# Patient Record
Sex: MALE | Race: BLACK OR AFRICAN AMERICAN | NOT HISPANIC OR LATINO | Employment: FULL TIME | URBAN - METROPOLITAN AREA
[De-identification: names, ages, dates, MRNs, and addresses within clinical notes are randomized per-mention and may not be internally consistent; named-entity substitution may affect disease eponyms.]

---

## 2024-10-12 ENCOUNTER — CLINICAL SUPPORT (OUTPATIENT)
Dept: EMERGENCY MEDICINE | Facility: HOSPITAL | Age: 34
End: 2024-10-12

## 2024-10-12 ENCOUNTER — HOSPITAL ENCOUNTER (OUTPATIENT)
Facility: HOSPITAL | Age: 34
Setting detail: OBSERVATION
End: 2024-10-12
Attending: EMERGENCY MEDICINE

## 2024-10-12 ENCOUNTER — APPOINTMENT (OUTPATIENT)
Dept: RADIOLOGY | Facility: HOSPITAL | Age: 34
End: 2024-10-12

## 2024-10-12 DIAGNOSIS — E11.65 TYPE 2 DIABETES MELLITUS WITH HYPERGLYCEMIA, WITH LONG-TERM CURRENT USE OF INSULIN: Primary | ICD-10-CM

## 2024-10-12 DIAGNOSIS — E10.9 INSULIN DEPENDENT DIABETES MELLITUS TYPE IA (MULTI): ICD-10-CM

## 2024-10-12 DIAGNOSIS — Z79.4 TYPE 2 DIABETES MELLITUS WITH HYPERGLYCEMIA, WITH LONG-TERM CURRENT USE OF INSULIN: Primary | ICD-10-CM

## 2024-10-12 DIAGNOSIS — Z91.199 MEDICALLY NONCOMPLIANT: ICD-10-CM

## 2024-10-12 DIAGNOSIS — R73.9 HYPERGLYCEMIA: ICD-10-CM

## 2024-10-12 DIAGNOSIS — E11.9 TYPE 2 DIABETES MELLITUS WITHOUT COMPLICATION, UNSPECIFIED WHETHER LONG TERM INSULIN USE (MULTI): ICD-10-CM

## 2024-10-12 DIAGNOSIS — K21.9 GASTROESOPHAGEAL REFLUX DISEASE, UNSPECIFIED WHETHER ESOPHAGITIS PRESENT: ICD-10-CM

## 2024-10-12 LAB
ALBUMIN SERPL BCP-MCNC: 4.6 G/DL (ref 3.4–5)
ALP SERPL-CCNC: 77 U/L (ref 33–120)
ALT SERPL W P-5'-P-CCNC: 18 U/L (ref 10–52)
ANION GAP SERPL CALC-SCNC: 13 MMOL/L (ref 10–20)
APPEARANCE UR: CLEAR
AST SERPL W P-5'-P-CCNC: 13 U/L (ref 9–39)
B-OH-BUTYR SERPL-SCNC: 0.31 MMOL/L (ref 0.02–0.27)
BASOPHILS # BLD AUTO: 0.05 X10*3/UL (ref 0–0.1)
BASOPHILS NFR BLD AUTO: 0.8 %
BILIRUB SERPL-MCNC: 0.5 MG/DL (ref 0–1.2)
BILIRUB UR STRIP.AUTO-MCNC: NEGATIVE MG/DL
BUN SERPL-MCNC: 8 MG/DL (ref 6–23)
CALCIUM SERPL-MCNC: 9.8 MG/DL (ref 8.6–10.6)
CARDIAC TROPONIN I PNL SERPL HS: 6 NG/L (ref 0–53)
CARDIAC TROPONIN I PNL SERPL HS: 7 NG/L (ref 0–53)
CARDIAC TROPONIN I PNL SERPL HS: 7 NG/L (ref 0–53)
CHLORIDE SERPL-SCNC: 101 MMOL/L (ref 98–107)
CO2 SERPL-SCNC: 25 MMOL/L (ref 21–32)
COLOR UR: ABNORMAL
CREAT SERPL-MCNC: 0.62 MG/DL (ref 0.5–1.3)
EGFRCR SERPLBLD CKD-EPI 2021: >90 ML/MIN/1.73M*2
EOSINOPHIL # BLD AUTO: 0.25 X10*3/UL (ref 0–0.7)
EOSINOPHIL NFR BLD AUTO: 4.2 %
ERYTHROCYTE [DISTWIDTH] IN BLOOD BY AUTOMATED COUNT: 12.6 % (ref 11.5–14.5)
GLUCOSE BLD MANUAL STRIP-MCNC: 183 MG/DL (ref 74–99)
GLUCOSE BLD MANUAL STRIP-MCNC: 280 MG/DL (ref 74–99)
GLUCOSE BLD MANUAL STRIP-MCNC: 497 MG/DL (ref 74–99)
GLUCOSE SERPL-MCNC: 294 MG/DL (ref 74–99)
GLUCOSE UR STRIP.AUTO-MCNC: ABNORMAL MG/DL
HCT VFR BLD AUTO: 43.2 % (ref 41–52)
HGB BLD-MCNC: 14.3 G/DL (ref 13.5–17.5)
IMM GRANULOCYTES # BLD AUTO: 0.01 X10*3/UL (ref 0–0.7)
IMM GRANULOCYTES NFR BLD AUTO: 0.2 % (ref 0–0.9)
INR PPP: 0.8 (ref 0.9–1.1)
KETONES UR STRIP.AUTO-MCNC: ABNORMAL MG/DL
LEUKOCYTE ESTERASE UR QL STRIP.AUTO: NEGATIVE
LIPASE SERPL-CCNC: 12 U/L (ref 9–82)
LYMPHOCYTES # BLD AUTO: 2.36 X10*3/UL (ref 1.2–4.8)
LYMPHOCYTES NFR BLD AUTO: 39.7 %
MCH RBC QN AUTO: 27.5 PG (ref 26–34)
MCHC RBC AUTO-ENTMCNC: 33.1 G/DL (ref 32–36)
MCV RBC AUTO: 83 FL (ref 80–100)
MONOCYTES # BLD AUTO: 0.39 X10*3/UL (ref 0.1–1)
MONOCYTES NFR BLD AUTO: 6.6 %
NEUTROPHILS # BLD AUTO: 2.88 X10*3/UL (ref 1.2–7.7)
NEUTROPHILS NFR BLD AUTO: 48.5 %
NITRITE UR QL STRIP.AUTO: NEGATIVE
NRBC BLD-RTO: 0 /100 WBCS (ref 0–0)
PH UR STRIP.AUTO: 5.5 [PH]
PLATELET # BLD AUTO: 365 X10*3/UL (ref 150–450)
POTASSIUM SERPL-SCNC: 3.8 MMOL/L (ref 3.5–5.3)
PROT SERPL-MCNC: 7.9 G/DL (ref 6.4–8.2)
PROT UR STRIP.AUTO-MCNC: NEGATIVE MG/DL
PROTHROMBIN TIME: 8.5 SECONDS (ref 9.8–12.8)
RBC # BLD AUTO: 5.2 X10*6/UL (ref 4.5–5.9)
RBC # UR STRIP.AUTO: NEGATIVE /UL
SODIUM SERPL-SCNC: 135 MMOL/L (ref 136–145)
SP GR UR STRIP.AUTO: >1.05
UROBILINOGEN UR STRIP.AUTO-MCNC: NORMAL MG/DL
WBC # BLD AUTO: 5.9 X10*3/UL (ref 4.4–11.3)

## 2024-10-12 PROCEDURE — 82947 ASSAY GLUCOSE BLOOD QUANT: CPT

## 2024-10-12 PROCEDURE — 83690 ASSAY OF LIPASE: CPT | Performed by: EMERGENCY MEDICINE

## 2024-10-12 PROCEDURE — 84484 ASSAY OF TROPONIN QUANT: CPT | Performed by: EMERGENCY MEDICINE

## 2024-10-12 PROCEDURE — 99285 EMERGENCY DEPT VISIT HI MDM: CPT | Performed by: EMERGENCY MEDICINE

## 2024-10-12 PROCEDURE — 93005 ELECTROCARDIOGRAM TRACING: CPT

## 2024-10-12 PROCEDURE — 71045 X-RAY EXAM CHEST 1 VIEW: CPT

## 2024-10-12 PROCEDURE — 36415 COLL VENOUS BLD VENIPUNCTURE: CPT

## 2024-10-12 PROCEDURE — 84075 ASSAY ALKALINE PHOSPHATASE: CPT | Performed by: EMERGENCY MEDICINE

## 2024-10-12 PROCEDURE — 85025 COMPLETE CBC W/AUTO DIFF WBC: CPT | Performed by: EMERGENCY MEDICINE

## 2024-10-12 PROCEDURE — 81003 URINALYSIS AUTO W/O SCOPE: CPT

## 2024-10-12 PROCEDURE — 84132 ASSAY OF SERUM POTASSIUM: CPT | Mod: 59

## 2024-10-12 PROCEDURE — 84484 ASSAY OF TROPONIN QUANT: CPT

## 2024-10-12 PROCEDURE — 2500000002 HC RX 250 W HCPCS SELF ADMINISTERED DRUGS (ALT 637 FOR MEDICARE OP, ALT 636 FOR OP/ED)

## 2024-10-12 PROCEDURE — 82435 ASSAY OF BLOOD CHLORIDE: CPT

## 2024-10-12 PROCEDURE — 99285 EMERGENCY DEPT VISIT HI MDM: CPT

## 2024-10-12 PROCEDURE — 85610 PROTHROMBIN TIME: CPT | Performed by: EMERGENCY MEDICINE

## 2024-10-12 PROCEDURE — 36415 COLL VENOUS BLD VENIPUNCTURE: CPT | Performed by: EMERGENCY MEDICINE

## 2024-10-12 PROCEDURE — 80053 COMPREHEN METABOLIC PANEL: CPT | Performed by: EMERGENCY MEDICINE

## 2024-10-12 PROCEDURE — 82010 KETONE BODYS QUAN: CPT

## 2024-10-12 PROCEDURE — 71045 X-RAY EXAM CHEST 1 VIEW: CPT | Performed by: RADIOLOGY

## 2024-10-12 PROCEDURE — 83036 HEMOGLOBIN GLYCOSYLATED A1C: CPT

## 2024-10-12 RX ORDER — INSULIN LISPRO 100 [IU]/ML
0-10 INJECTION, SOLUTION INTRAVENOUS; SUBCUTANEOUS
Status: DISCONTINUED | OUTPATIENT
Start: 2024-10-12 | End: 2024-10-13

## 2024-10-12 RX ADMIN — INSULIN LISPRO 4 UNITS: 100 INJECTION, SOLUTION INTRAVENOUS; SUBCUTANEOUS at 18:39

## 2024-10-12 ASSESSMENT — COLUMBIA-SUICIDE SEVERITY RATING SCALE - C-SSRS
6. HAVE YOU EVER DONE ANYTHING, STARTED TO DO ANYTHING, OR PREPARED TO DO ANYTHING TO END YOUR LIFE?: NO
1. IN THE PAST MONTH, HAVE YOU WISHED YOU WERE DEAD OR WISHED YOU COULD GO TO SLEEP AND NOT WAKE UP?: NO
2. HAVE YOU ACTUALLY HAD ANY THOUGHTS OF KILLING YOURSELF?: NO

## 2024-10-12 ASSESSMENT — PAIN - FUNCTIONAL ASSESSMENT: PAIN_FUNCTIONAL_ASSESSMENT: 0-10

## 2024-10-12 ASSESSMENT — LIFESTYLE VARIABLES
HAVE YOU EVER FELT YOU SHOULD CUT DOWN ON YOUR DRINKING: NO
EVER HAD A DRINK FIRST THING IN THE MORNING TO STEADY YOUR NERVES TO GET RID OF A HANGOVER: NO
EVER FELT BAD OR GUILTY ABOUT YOUR DRINKING: NO
HAVE PEOPLE ANNOYED YOU BY CRITICIZING YOUR DRINKING: NO
TOTAL SCORE: 0

## 2024-10-12 ASSESSMENT — PAIN DESCRIPTION - LOCATION: LOCATION: CHEST

## 2024-10-12 ASSESSMENT — PAIN SCALES - GENERAL: PAINLEVEL_OUTOF10: 6

## 2024-10-12 NOTE — ED PROVIDER NOTES
History of Present Illness     History provided by: Patient   Limitations to History: None   External Records Reviewed with Brief Summary: I reviewed the patient's ED note from [facility] where he was seen and evaluated on 10/12/2024.    HPI:  Maximino Solomon is a 33 y.o. male with a past medical history of insulin-dependent diabetes presenting to the emergency department today with concern for hyperglycemia.  Notes that he has been having some chest pain in addition to abdominal pain that is consistent with the symptoms he experiences when his blood glucose is elevated.  States that he is supposed to be taking insulin however has not taken insulin in several weeks, has difficulty with affordability of the medications in addition to insurance status.  Is only on insulin is not on any oral medications.  Notes that he has been having some nausea but has not experienced any vomiting.  Denies any shortness of breath.  Describes the chest pain as a sharp pain that comes on with rolling in bed and constant with movement.  Notes that he is been having some urinary frequency as well.  Denies any vision changes headache or dizziness.  He said he is supposed to be employed by [employer] in several weeks and then has 90 days prior to his insurance kicking in.  No other associated signs or symptoms report at this time.  Physical Exam   Triage vitals:  T 36.8 °C (98.2 °F)  HR 92  /71  RR 16  O2 94 % None (Room air)    Physical Exam  Constitutional:       General: He is not in acute distress.     Appearance: Normal appearance. He is not toxic-appearing.   HENT:      Head: Normocephalic and atraumatic.      Mouth/Throat:      Mouth: Mucous membranes are moist.   Eyes:      General: No scleral icterus.     Extraocular Movements: Extraocular movements intact.      Conjunctiva/sclera: Conjunctivae normal.   Cardiovascular:      Rate and Rhythm: Normal rate and regular rhythm.      Pulses: Normal pulses.   Pulmonary:       Effort: Pulmonary effort is normal.      Breath sounds: Normal breath sounds.   Chest:      Chest wall: Tenderness (Over the sternum and reproducible with palpation and movement.) present. No mass or crepitus.   Abdominal:      General: There is no distension.      Palpations: Abdomen is soft. There is no mass.      Tenderness: There is abdominal tenderness (Minimal diffuse). There is no guarding or rebound.   Musculoskeletal:         General: Normal range of motion.      Cervical back: Normal range of motion and neck supple.   Skin:     General: Skin is warm and dry.   Neurological:      General: No focal deficit present.      Mental Status: He is alert.   Psychiatric:         Mood and Affect: Mood normal.         Behavior: Behavior normal.         Thought Content: Thought content normal.         Judgment: Judgment normal.          Medical Decision Making & ED Course   Medical Decision Makin y.o. male with the above past medical she present to the emergency department today with concern for hyperglycemia in the setting of difficulty with obtaining medications and lack of insurance.  Patient's symptoms are consistent with previous episodes of hypoglycemia.  Patient symptoms today are not consistent with DKA.  Patient's glucose was elevated initially in triage 497 improved to 80 without any intervention.  Was placed on sliding scale insulin was given 4 units based off of the recommendations and down trended to 183.  Stated that he was hungry and would like something to eat patient was allowed to eat.  Patient continued on insulin and being admitted to CDU for further management and evaluation by endocrinology and diabetic educator.  Will also attempt to get meds to beds so the patient can have his insulin medication delivered to bedside which we will hold him over until his next appointment or insurance validation.  Patient is agreeable with this plan.  Patient's CBC otherwise reassuring.  CMP otherwise  reassuring.  Delta troponin negative.  Chest x-ray without any concerning findings.  Chest pain is noncardiac in etiology likely pain is located over the mid sternum and present with palpation.  It is reproducible on palpation.  Patient's labs in addition to his EKG were otherwise reassuring.  Patient admitted to the CDU in stable condition.  ----      Differential diagnoses considered include but are not limited to: DKA, hyperglycemia, acute coronary syndrome, costochondritis, difficulty with obtaining medications     Social Determinants of Health which Significantly Impact Care: Difficulty obtaining outpatient follow-up, Difficulty paying for/obtaining medications, and Financial difficulties The following actions were taken to address these social determinants: Patient offered evaluation by Social Work and meds to beds for medications.    EKG Independent Interpretation: See ED Course    Independent Result Review and Interpretation: See ED course    Chronic conditions affecting the patient's care: See HPI    The patient was discussed with the following consultants/services:  Spoke to the CDU provider who accepted the patient under their service.    Care Considerations: See [facility]    ED Course:  ED Course as of 10/13/24 0120   Sat Oct 12, 2024   1922 ED Attending Attestation: 33-year-old male who is insulin-dependent diabetic who presents with chest pain and abdominal pain as well as elevated blood sugars x 1 week.  Chest pain is described as an aching as well as a pressure, midsternal, worse with palpation of his chest and rolling over.  No prior history of cardiac stress test but does have a family history of heart disease.  Abdominal pain is located in his lower abdomen, midline, denied any urinary symptoms.  Well-appearing in no acute distress.  Moist mucous membranes.  Nontoxic. Hyperglycemic but not in DKA.  - Magdaleno Byrne MD, MPH  ED Attending  [KF]      ED Course User Index  [KF] Magdaleno Byrne MD MPH          Diagnoses as of 10/13/24 0120   Hyperglycemia     Disposition   Sent to CDU    Seen and discussed with ED Attending  Kenya Mitchell DO, PGY-2  Emergency Medicine     Kenya Mitchell DO  Resident  10/13/24 0056       Kenya Mitchell DO  Resident  10/13/24 0120       Kenya Mitchell DO  Resident  10/13/24 0121

## 2024-10-12 NOTE — ED TRIAGE NOTES
Pt here for c/o cp and hyperglycemia.  at home, dm2, pt states he currently does not have any insulin d/t [place] of state.

## 2024-10-13 VITALS
BODY MASS INDEX: 39.7 KG/M2 | WEIGHT: 247 LBS | HEART RATE: 72 BPM | OXYGEN SATURATION: 97 % | DIASTOLIC BLOOD PRESSURE: 84 MMHG | RESPIRATION RATE: 17 BRPM | SYSTOLIC BLOOD PRESSURE: 154 MMHG | TEMPERATURE: 97.3 F | HEIGHT: 66 IN

## 2024-10-13 PROBLEM — Z79.4 TYPE 2 DIABETES MELLITUS WITH HYPERGLYCEMIA, WITH LONG-TERM CURRENT USE OF INSULIN: Status: ACTIVE | Noted: 2024-10-13

## 2024-10-13 PROBLEM — R73.9 HYPERGLYCEMIA: Status: ACTIVE | Noted: 2024-10-13

## 2024-10-13 PROBLEM — E11.65 TYPE 2 DIABETES MELLITUS WITH HYPERGLYCEMIA, WITH LONG-TERM CURRENT USE OF INSULIN: Status: ACTIVE | Noted: 2024-10-13

## 2024-10-13 LAB
EST. AVERAGE GLUCOSE BLD GHB EST-MCNC: 321 MG/DL
GLUCOSE BLD MANUAL STRIP-MCNC: 247 MG/DL (ref 74–99)
GLUCOSE BLD MANUAL STRIP-MCNC: 267 MG/DL (ref 74–99)
GLUCOSE BLD MANUAL STRIP-MCNC: 268 MG/DL (ref 74–99)
GLUCOSE BLD MANUAL STRIP-MCNC: 274 MG/DL (ref 74–99)
GLUCOSE BLD MANUAL STRIP-MCNC: 281 MG/DL (ref 74–99)
HBA1C MFR BLD: 12.8 %
HOLD SPECIMEN: NORMAL

## 2024-10-13 PROCEDURE — 82947 ASSAY GLUCOSE BLOOD QUANT: CPT

## 2024-10-13 PROCEDURE — 99223 1ST HOSP IP/OBS HIGH 75: CPT | Performed by: NURSE PRACTITIONER

## 2024-10-13 PROCEDURE — 2500000002 HC RX 250 W HCPCS SELF ADMINISTERED DRUGS (ALT 637 FOR MEDICARE OP, ALT 636 FOR OP/ED)

## 2024-10-13 PROCEDURE — RXMED WILLOW AMBULATORY MEDICATION CHARGE

## 2024-10-13 PROCEDURE — G0378 HOSPITAL OBSERVATION PER HR: HCPCS

## 2024-10-13 PROCEDURE — 2500000002 HC RX 250 W HCPCS SELF ADMINISTERED DRUGS (ALT 637 FOR MEDICARE OP, ALT 636 FOR OP/ED): Performed by: PHYSICIAN ASSISTANT

## 2024-10-13 PROCEDURE — 2500000001 HC RX 250 WO HCPCS SELF ADMINISTERED DRUGS (ALT 637 FOR MEDICARE OP)

## 2024-10-13 RX ORDER — DEXTROSE 50 % IN WATER (D50W) INTRAVENOUS SYRINGE
25
Status: DISCONTINUED | OUTPATIENT
Start: 2024-10-13 | End: 2024-10-14 | Stop reason: HOSPADM

## 2024-10-13 RX ORDER — INSULIN GLARGINE 100 [IU]/ML
20 INJECTION, SOLUTION SUBCUTANEOUS EVERY 24 HOURS
Status: DISCONTINUED | OUTPATIENT
Start: 2024-10-13 | End: 2024-10-14 | Stop reason: HOSPADM

## 2024-10-13 RX ORDER — PEN NEEDLE, DIABETIC 30 GX3/16"
1 NEEDLE, DISPOSABLE MISCELLANEOUS 4 TIMES DAILY
Qty: 200 EACH | Refills: 0 | Status: SHIPPED | OUTPATIENT
Start: 2024-10-13

## 2024-10-13 RX ORDER — INSULIN GLARGINE 100 [IU]/ML
20 INJECTION, SOLUTION SUBCUTANEOUS EVERY MORNING
Qty: 15 ML | Refills: 0 | Status: SHIPPED | OUTPATIENT
Start: 2024-10-13 | End: 2024-10-14 | Stop reason: WASHOUT

## 2024-10-13 RX ORDER — PANTOPRAZOLE SODIUM 40 MG/1
40 TABLET, DELAYED RELEASE ORAL
Status: DISCONTINUED | OUTPATIENT
Start: 2024-10-13 | End: 2024-10-14 | Stop reason: HOSPADM

## 2024-10-13 RX ORDER — INSULIN LISPRO 100 [IU]/ML
6 INJECTION, SOLUTION INTRAVENOUS; SUBCUTANEOUS
Qty: 9 ML | Refills: 0 | Status: SHIPPED | OUTPATIENT
Start: 2024-10-13

## 2024-10-13 RX ORDER — ISOPROPYL ALCOHOL 70 ML/100ML
SWAB TOPICAL
Qty: 400 EACH | Refills: 0 | Status: SHIPPED | OUTPATIENT
Start: 2024-10-13

## 2024-10-13 RX ORDER — INSULIN LISPRO 100 [IU]/ML
0-10 INJECTION, SOLUTION INTRAVENOUS; SUBCUTANEOUS
Qty: 15 ML | Refills: 0 | Status: SHIPPED | OUTPATIENT
Start: 2024-10-13

## 2024-10-13 RX ORDER — INSULIN LISPRO 100 [IU]/ML
8-16 INJECTION, SOLUTION INTRAVENOUS; SUBCUTANEOUS
Qty: 5 EACH | Refills: 0 | Status: SHIPPED | OUTPATIENT
Start: 2024-10-13 | End: 2024-10-13 | Stop reason: WASHOUT

## 2024-10-13 RX ORDER — INSULIN LISPRO 100 [IU]/ML
0-10 INJECTION, SOLUTION INTRAVENOUS; SUBCUTANEOUS
Status: DISCONTINUED | OUTPATIENT
Start: 2024-10-13 | End: 2024-10-14

## 2024-10-13 RX ORDER — DEXTROSE 50 % IN WATER (D50W) INTRAVENOUS SYRINGE
12.5
Status: DISCONTINUED | OUTPATIENT
Start: 2024-10-13 | End: 2024-10-14 | Stop reason: HOSPADM

## 2024-10-13 RX ORDER — OMEPRAZOLE 20 MG/1
1 CAPSULE, DELAYED RELEASE ORAL DAILY
COMMUNITY
Start: 2023-07-15

## 2024-10-13 RX ORDER — INSULIN LISPRO 100 [IU]/ML
6 INJECTION, SOLUTION INTRAVENOUS; SUBCUTANEOUS
Status: DISCONTINUED | OUTPATIENT
Start: 2024-10-13 | End: 2024-10-14

## 2024-10-13 RX ADMIN — INSULIN GLARGINE 20 UNITS: 100 INJECTION, SOLUTION SUBCUTANEOUS at 16:35

## 2024-10-13 RX ADMIN — INSULIN LISPRO 6 UNITS: 100 INJECTION, SOLUTION INTRAVENOUS; SUBCUTANEOUS at 16:36

## 2024-10-13 RX ADMIN — INSULIN LISPRO 6 UNITS: 100 INJECTION, SOLUTION INTRAVENOUS; SUBCUTANEOUS at 08:28

## 2024-10-13 RX ADMIN — PANTOPRAZOLE SODIUM 40 MG: 40 TABLET, DELAYED RELEASE ORAL at 08:28

## 2024-10-13 RX ADMIN — INSULIN LISPRO 6 UNITS: 100 INJECTION, SOLUTION INTRAVENOUS; SUBCUTANEOUS at 16:35

## 2024-10-13 RX ADMIN — INSULIN LISPRO 6 UNITS: 100 INJECTION, SOLUTION INTRAVENOUS; SUBCUTANEOUS at 11:43

## 2024-10-13 ASSESSMENT — ENCOUNTER SYMPTOMS
POLYDIPSIA: 0
FATIGUE: 1
ACTIVITY CHANGE: 0
FREQUENCY: 0
SORE THROAT: 0
DYSURIA: 0
COUGH: 0
JOINT SWELLING: 0
AGITATION: 0
ABDOMINAL PAIN: 0
DIARRHEA: 0
HEADACHES: 0
SHORTNESS OF BREATH: 0
CONFUSION: 0
UNEXPECTED WEIGHT CHANGE: 0
APPETITE CHANGE: 0
EYES NEGATIVE: 1
CHEST TIGHTNESS: 0
POLYPHAGIA: 0
NUMBNESS: 0
NAUSEA: 0
SPEECH DIFFICULTY: 0
DIAPHORESIS: 0

## 2024-10-13 ASSESSMENT — PAIN SCALES - GENERAL: PAINLEVEL_OUTOF10: 0 - NO PAIN

## 2024-10-13 NOTE — CONSULTS
Inpatient consult to Endocrinology  Consult performed by: MARSHALL Mcmahon  Consult ordered by: MARSHALL Bishop      Reason For Consult  Diabetic education and medical medication changes     History Of Present Illness  Maximino Solomon is a 33 y.o. male presenting with hyperglycemia, no evidence of DKA. Patient has a history of type 2 diabetes on insulin. Patient reports he was established with endocrinology in [place] but then moved to [place] in July of 2023. He struggled with maintaining insurance and insulin affordability. He moved back to [place] with in the past 2 weeks and has been without insulin for about a week. He will be starting a new job at [employer] but his health insurance will not be effective until 90 days after hire, and then will have to wait for open enrollment. Patient thinks he should qualify for Medicaid.      Will request social work visit the patient, but also helped patient download the [employer] Insulin Value Savings card which limits copay to 35$ per insulin per insulin per month.     Diabetes History  Type of diabetes: Type 2  Year diagnosed or age: [age]  Hospitalizations for DKA or HHS: patient reports multiple admissions for hyperglycemia and reports what sounds like an ICU admission for DKA in [place]  Complications: hyperglycemia  Seen by PCP or Endocrinology: previously seeing endocrinology, but has been followed by PCP in [place]  Frequency of glucose checks: checking via Dexcom G7 with .   Glucose review: Unable to review dexcom data - new phone does not allow use of dexcom G7 kat  Frequency of Hypoglycemia: a few times a week when taking insulin   Hypoglycemia unawareness:  senses hypoglycemia in the 60's  Severe hypoglycemia requiring assistance from others:  No    Home Medications  Basal: Most recently taking 30 units of glargine daily  Prandial: none  Correction: ademelog correction scale  Orals: metformin, glipizide in the past  GLP-1: Trulicity in  the past  CGM: Dexcom G7 with      Past Medical History  He has no past medical history on file.    Surgical History  He has no past surgical history on file.     Social History  He has no history on file for tobacco use, alcohol use, and drug use.    Family History  No family history on file.  Strong family history of type 2 diabetes; father, paternal and maternal grandparents, uncles    Allergies  Patient has no known allergies.    Review of Systems   Constitutional:  Positive for fatigue. Negative for activity change, appetite change, diaphoresis and unexpected weight change.   HENT: Negative.  Negative for sore throat.    Eyes: Negative.    Respiratory:  Negative for cough, chest tightness and shortness of breath.    Cardiovascular:  Negative for chest pain.   Gastrointestinal:  Negative for abdominal pain, diarrhea and nausea.   Endocrine: Negative for cold intolerance, heat intolerance, polydipsia, polyphagia and polyuria.   Genitourinary:  Negative for dysuria and frequency.   Musculoskeletal:  Negative for gait problem and joint swelling.   Neurological:  Negative for speech difficulty, numbness and headaches.   Psychiatric/Behavioral:  Negative for agitation, behavioral problems and confusion.       Physical Exam  Constitutional:       General: He is not in acute distress.     Appearance: Normal appearance.   HENT:      Nose: Nose normal.      Mouth/Throat:      Mouth: Mucous membranes are moist.      Pharynx: Oropharynx is clear.   Cardiovascular:      Rate and Rhythm: Normal rate and regular rhythm.   Pulmonary:      Effort: Pulmonary effort is normal. No respiratory distress.   Abdominal:      General: There is no distension.      Palpations: Abdomen is soft.   Musculoskeletal:         General: Normal range of motion.   Skin:     General: Skin is warm and dry.   Neurological:      Mental Status: He is alert and oriented to person, place, and time.   Psychiatric:         Mood and Affect: Mood  "normal.         Behavior: Behavior normal.        ROS, PMH, FH/SH, surgical history and allergies have been reviewed.    Last Recorded Vitals  Blood pressure 149/78, pulse 93, temperature 36.4 °C (97.5 °F), temperature source Temporal, resp. rate 18, height 1.676 m (5' 6\"), weight 112 kg (247 lb), SpO2 99%.    Relevant Results  Results from last 7 days   Lab Units 10/13/24  0827 10/13/24  0606 10/12/24  2339 10/12/24  1838 10/12/24  1437 10/12/24  1416   POCT GLUCOSE mg/dL 268* 247* 183* 280*  --  497*   GLUCOSE mg/dL  --   --   --   --  294*  --      Lab Review  Lab Results   Component Value Date    BILITOT 0.5 10/12/2024    CALCIUM 9.8 10/12/2024    CO2 25 10/12/2024     10/12/2024    CREATININE 0.62 10/12/2024    GLUCOSE 294 (H) 10/12/2024    ALKPHOS 77 10/12/2024    K 3.8 10/12/2024    PROT 7.9 10/12/2024     (L) 10/12/2024    AST 13 10/12/2024    ALT 18 10/12/2024    BUN 8 10/12/2024    ANIONGAP 13 10/12/2024    MG 2.24 09/21/2020    ALBUMIN 4.6 10/12/2024    LIPASE 12 10/12/2024    GFRMALE >90 03/28/2022     No results found for: \"TRIG\", \"CHOL\", \"LDLCALC\", \"HDL\"  Lab Results   Component Value Date    HGBA1C 12.8 (H) 10/12/2024    HGBA1C 12.3 (H) 07/26/2023    HGBA1C 8.2 (H) 01/13/2021     The ASCVD Risk score (Robbie DK, et al., 2019) failed to calculate for the following reasons:    The 2019 ASCVD risk score is only valid for ages 40 to 79    Assessment/Plan   Assessment & Plan  Hyperglycemia    Type 2 diabetes mellitus with hyperglycemia, with long-term current use of insulin      PLAN  Steroids: NA   Nutrition: 60 gram carb consistent  - start glargine 20 units daily        If NPO: reduce to 15  - start lispro 6 with meals plus scale       If NPO: hold  - continue lispro corrective scale #2 with meals, adjust to q4h if NPO or if persistently hyperglycemic   = 0u  151-200 = 2u  201-250 = 4u  251-300 = 6u  301-350 = 8u  351-400 = 10u    -Accuchecks (not BMP) TIDAC and QHS- kindly ensure QHS " Accucheck is drawn; it is often missed   - Goal -180  -Hypoglycemia protocol  -Will continue to follow and titrate insulin accordingly      Discharge planning:   [x] patient may expect to discharge home on the inpatient regimen above  Recommend restart of GLP-1 outpatient once insurance is obtained  [x]Dexcom G7 sensor and  provided to patient   [x]Patient enrolled in the  pharmacy platinum plan program  [x]follow up with Priyanka Meyer PA-C - message sent to scheduling     I spent 80 minutes in the professional and overall care of this patient.      Marlin Brock, APRN-CNP

## 2024-10-13 NOTE — PROGRESS NOTES
CDU Progress Note    Subjective  Maximino Solomon has been admitted to the CDU for 10 hours. Serial assessments of this patinet's clinical progress include:  1) was seen by endocrinologist who placed final recommendations  2) currently not covered by any medical insurance including Medicaid.  Patient stated that co-pay from meds to bed would be too much.      Objective  VS reviewed  Physical Exam:   GENERAL: No distress.  Normocephalic atraumatic.   EYES:  PERRLA, EOMs intact  OROPHARYNX:  No erythema or exudate.  Mucosa moist.  NECK: Supple. No adenopathy.  CARDIAC: Regular rate rhythm. No murmur noted.  PULMONARY: Equal breath sounds bilaterally.  No wheezes rales or rhonchi  ABDOMEN: Soft, nontender, nonsurgical. No guarding. Normoactive bowel sounds. No bruits, no masses  EXTREMITIES: Full ROM, no pitting edema,   SKIN: Intact, warm and dry  NEURO: Alert and oriented x 3, speech is clear, no obvious deficits noted.       Assessment / Plan  Maximino Solomon continues to be managed in accordance with the CDU clinical guidelines for hyperglycemia in the setting of poorly controlled IDDM. An update of their clinical problem list included:   1) due to patient not being able to cover co-pay with medication sent from [facility], discussion was made with ED attending and , we will keep him overnight,  states that we will be able to get him bridge coverage to have medications covered tomorrow, will monitor blood glucose and discharge in the morning with medications      Maxx Gilliland PA-C

## 2024-10-13 NOTE — H&P
History and Physical  [facility] EMERGENCY MEDICINE  Patient: Maximino Solomon  MRN: [id number]  : 1990  Date of Evaluation: 10/12/2024  ED Provider: MARSHALL Bishop      Limitations to history: None  Independent Historian: Yes  External Records Reviewed: Yes      Patient History:  Maximino Solomon is a 33 y.o. male with past medical history significant for IDDM and GERD, admitted to the clinical decision unit for Hyperglycemia.  Patient evaluated in the emergency department for concern of elevated blood glucose.  Patient states he moved to [place], where he has no insurance, however recently moved back and has not taken his insulin in several weeks. He reports body aches, midsternal chest pain with movement, headache, blurry vision, dizziness and increased urination.  He states he stood up and fell forward, states he did not hit his head, no loss of consciousness and no blood thinner use.  He denies radiating pain to neck, jaw, back or upper extremities, fever, nausea, vomiting or shortness of breath.      The acute medical evaluation while in the Emergency Department consist of an EKG, labs and Imagining.  -> EKG: NSR, No acute ischemia  -> CBC: Unremarkable  -> CMP: Hyperglycemia of 294, given Lispro 4 units subcutaneous, POCT . Anion gap 13. Potassium 3.8. No BASHIR  -> Serial Troponins WNL, 7, 6  ->Lipase: WNL  -> Beta Hydroxybutyrate elevated at 0.31  -> Urinalysis: No infection    -> CXR showed no evidence of acute cardiopulmonary process or osseous changes.       While in the ED, he received Lispro 4 units.    After discussion with the ED provider, a decision was made to admit the patient to the Clinical Decision Unit for Hyperglycemia.  Plan is consult Endocrinology.    In the emergency department, the acute evaluation included:  Orders Placed This Encounter   Procedures    XR chest 1 view    CBC with Differential    Comprehensive Metabolic Panel    Troponin I Series, High  Sensitivity (0, 1 HR)    Lipase    Protime-INR    Troponin I, High Sensitivity, Initial    Blood Gas Venous Full Panel    Urinalysis with Reflex Culture and Microscopic    Urinalysis with Reflex Culture and Microscopic    Extra Urine Gray Tube    Beta Hydroxybutyrate    Troponin Series, (0, 1 HR)    Troponin I, High Sensitivity, Initial    Troponin, High Sensitivity, 1 Hour    Blood Gas Venous Full Panel    Communication - NIPP Chest Pain    Communication - Print out prior EKG for comparison    NIPP Communication Altered Mental Status    POCT GLUCOSE    POCT GLUCOSE    POCT GLUCOSE    ECG 12 lead    ECG 12 Lead    Insert and maintain peripheral IV    Insert and maintain peripheral IV    Saline lock IV       I reviewed the below labs and imaging as ordered by the ED provider:  XR chest 1 view   Final Result   1.  No evidence of acute cardiopulmonary process.                  MACRO:   None        Signed by: Julian Wright 10/12/2024 3:32 PM   Dictation workstation:   [id number]          Labs Reviewed   COMPREHENSIVE METABOLIC PANEL - Abnormal       Result Value    Glucose 294 (*)     Sodium 135 (*)     Potassium 3.8      Chloride 101      Bicarbonate 25      Anion Gap 13      Urea Nitrogen 8      Creatinine 0.62      eGFR >90      Calcium 9.8      Albumin 4.6      Alkaline Phosphatase 77      Total Protein 7.9      AST 13      Bilirubin, Total 0.5      ALT 18     PROTIME-INR - Abnormal    Protime 8.5 (*)     INR 0.8 (*)    URINALYSIS WITH REFLEX CULTURE AND MICROSCOPIC - Abnormal    Color, Urine Light-Yellow      Appearance, Urine Clear      Specific Gravity, Urine >1.050 (*)     pH, Urine 5.5      Protein, Urine NEGATIVE      Glucose, Urine OVER (4+) (*)     Blood, Urine NEGATIVE      Ketones, Urine TRACE (*)     Bilirubin, Urine NEGATIVE      Urobilinogen, Urine Normal      Nitrite, Urine NEGATIVE      Leukocyte Esterase, Urine NEGATIVE      Narrative:     OVER is reported when the result is greater than the  clinically reportable range.   BETA HYDROXYBUTYRATE - Abnormal    Beta-Hydroxybutyrate 0.31 (*)     Narrative:     The beta-hydroxybutyrate test performance characteristics have been validated by  [facility]. This test has not been approved by the FDA; however such approval is not necessary.     POCT GLUCOSE - Abnormal    POCT Glucose 497 (*)    POCT GLUCOSE - Abnormal    POCT Glucose 280 (*)    POCT GLUCOSE - Abnormal    POCT Glucose 183 (*)    LIPASE - Normal    Lipase 12      Narrative:     Venipuncture immediately after or during the administration of Metamizole may lead to falsely low results. Testing should be performed immediately prior to Metamizole dosing.   SERIAL TROPONIN-INITIAL - Normal    Troponin I, High Sensitivity (CMC) 7      Narrative:     Less than 99th percentile of normal range cutoff-  Female and children under 18 years old <35 ng/L; Male <54 ng/L: Negative  Repeat testing should be performed if clinically indicated.     Female and children under 18 years old  ng/L; Male  ng/L:  Consistent with possible cardiac damage and possible increased clinical   risk. Serial measurements may help to assess extent of myocardial damage.     >120 ng/L: Consistent with cardiac damage, increased clinical risk and  myocardial infarction. Serial measurements may help assess extent of   myocardial damage.      NOTE: Children less than 1 year old may have higher baseline troponin   levels and results should be interpreted in conjunction with the overall   clinical context.    NOTE: Troponin I testing is performed using a different   testing methodology at [facility] than at other   [facility]. Direct result comparisons should only   be made within the same method.     SERIAL TROPONIN-INITIAL - Normal    Troponin I, High Sensitivity (CMC) 6      Narrative:     Less than 99th percentile of normal range cutoff-  Female and children under  18 years old <35 ng/L; Male <54 ng/L: Negative  Repeat testing should be performed if clinically indicated.     Female and children under 18 years old  ng/L; Male  ng/L:  Consistent with possible cardiac damage and possible increased clinical   risk. Serial measurements may help to assess extent of myocardial damage.     >120 ng/L: Consistent with cardiac damage, increased clinical risk and  myocardial infarction. Serial measurements may help assess extent of   myocardial damage.      NOTE: Children less than 1 year old may have higher baseline troponin   levels and results should be interpreted in conjunction with the overall   clinical context.    NOTE: Troponin I testing is performed using a different   testing methodology at [facility] than at other   [facility]. Direct result comparisons should only   be made within the same method.     SERIAL TROPONIN, 1 HOUR - Normal    Troponin I, High Sensitivity (CMC) 7      Narrative:     Less than 99th percentile of normal range cutoff-  Female and children under 18 years old <35 ng/L; Male <54 ng/L: Negative  Repeat testing should be performed if clinically indicated.     Female and children under 18 years old  ng/L; Male  ng/L:  Consistent with possible cardiac damage and possible increased clinical   risk. Serial measurements may help to assess extent of myocardial damage.     >120 ng/L: Consistent with cardiac damage, increased clinical risk and  myocardial infarction. Serial measurements may help assess extent of   myocardial damage.      NOTE: Children less than 1 year old may have higher baseline troponin   levels and results should be interpreted in conjunction with the overall   clinical context.    NOTE: Troponin I testing is performed using a different   testing methodology at [facility] than at other   [facility]. Direct result comparisons should only   be made within the same method.     CBC  WITH AUTO DIFFERENTIAL    WBC 5.9      nRBC 0.0      RBC 5.20      Hemoglobin 14.3      Hematocrit 43.2      MCV 83      MCH 27.5      MCHC 33.1      RDW 12.6      Platelets 365      Neutrophils % 48.5      Immature Granulocytes %, Automated 0.2      Lymphocytes % 39.7      Monocytes % 6.6      Eosinophils % 4.2      Basophils % 0.8      Neutrophils Absolute 2.88      Immature Granulocytes Absolute, Automated 0.01      Lymphocytes Absolute 2.36      Monocytes Absolute 0.39      Eosinophils Absolute 0.25      Basophils Absolute 0.05     TROPONIN SERIES- (INITIAL, 1 HR)    Narrative:     The following orders were created for panel order Troponin I Series, High Sensitivity (0, 1 HR).  Procedure                               Abnormality         Status                     ---------                               -----------         ------                     Troponin I, High Sensiti...[[id number]]  Normal              Final result               Troponin, High Sensitivi...[[id number]]                                                   Please view results for these tests on the individual orders.   TROPONIN SERIES- (INITIAL, 1 HR)    Narrative:     The following orders were created for panel order Troponin Series, (0, 1 HR).  Procedure                               Abnormality         Status                     ---------                               -----------         ------                     Troponin I, High Sensiti...[[id number]]  Normal              Final result               Troponin, High Sensitivi...[[id number]]  Normal              Final result                 Please view results for these tests on the individual orders.   BLOOD GAS VENOUS FULL PANEL   URINALYSIS WITH REFLEX CULTURE AND MICROSCOPIC    Narrative:     The following orders were created for panel order Urinalysis with Reflex Culture and Microscopic.  Procedure                               Abnormality         Status                     ---------                                -----------         ------                     Urinalysis with Reflex C...[[id number]]  Abnormal            Final result               Extra Urine Gray Tube[[id number]]                            In process                   Please view results for these tests on the individual orders.   EXTRA URINE GRAY TUBE   BLOOD GAS VENOUS FULL PANEL   POCT GLUCOSE METER       Upon admission to the Clinical Decision Unit,  Maximino Solomon is alert and oriented and appears in no acute distress.  Vital signs were reviewed.    Past History   No past medical history on file.  No past surgical history on file.  Social History     Socioeconomic History    Marital status: Single     Social Determinants of Health     Financial Resource Strain: Medium Risk (4/17/2022)    Received from [facility]    Overall Financial Resource Strain (CARDIA)     Difficulty of Paying Living Expenses: Somewhat hard   Food Insecurity: Unknown (7/15/2023)    Received from [facility]    Hunger Vital Sign     Worried About Running Out of Food in the Last Year: Never true   Transportation Needs: No Transportation Needs (4/17/2022)    Received from [facility]    PRAPARE - Transportation     Lack of Transportation (Medical): No     Lack of Transportation (Non-Medical): No   Physical Activity: Inactive (4/17/2022)    Received from [facility]    Exercise Vital Sign     Days of Exercise per Week: 0 days     Minutes of Exercise per Session: 0 min   Stress: Stress Concern Present (4/17/2022)    Received from [facility]    [place] [employer] [place] [employer] - Occupational Stress Questionnaire     Feeling of Stress : To some extent   Social Connections: Socially Isolated (4/17/2022)    Received from [facility]    Social Connection and Isolation Panel [NHANES]     Frequency of Communication with Friends  "and Family: Three times a week     Frequency of Social Gatherings with Friends and Family: Never     Attends Hinduism Services: Never     Active Member of Clubs or Organizations: No     Attends Club or Organization Meetings: Never     Marital Status: Never    Housing Stability: Unknown (4/17/2022)    Received from [facility]    Housing Stability Vital Sign     Number of Places Lived in the Last Year: 0     Unstable Housing in the Last Year: Patient refused         Medications/Allergies     Previous Medications    No medications on file     No Known Allergies          Review of systems:  All systems reviewed and otherwise negative, except as stated above in HPI.        Physical Exam     Visit Vitals  /73   Pulse 72   Temp 36.8 °C (98.2 °F) (Tympanic)   Resp 16   Ht 1.676 m (5' 6\")   Wt 112 kg (247 lb)   SpO2 100%   BMI 39.87 kg/m²   BSA 2.28 m²       GENERAL:  The patient appears nontoxic, nourished and normally developed. Vital signs as documented.     HEENT:  Head normocephalic, atraumatic, EOMs intact, Mucous membranes moist. Nares patent without copious rhinorrhea.  Oropharynx moist and clear.  Uvula midline.    Neck: Supple.  No meningismus.  No swelling.  Trachea midline. No lymphadenopathy.    Pulmonary:  Lungs are clear to auscultation, without any respiratory distress.  No wheezing, crackles or rales.  No hypoxia or dyspnea.  Able to speak full sentences, no accessory muscle use.    Cardiac:  Regular rate and rhythm. No murmurs, rubs or gallops.  No JVD.    GI:  Soft, non-distended, non-tender, BS positive x 4 quadrants, No rebound or guarding, no peritoneal signs, no CVA tenderness, no masses or organomegaly.    Musculoskeletal: Symmetrical muscle bulk. No peripheral edema.  Pulses intact distal.  Able to walk.    Integumentary: Warm, dry and intact.  No pallor or jaundice.  No lesions, rashes or open sores.    NEURO:  No obvious neurological deficits, normal sensation " and strength bilaterally.  Speech clear and fluent.  Able to follow commands, CN 2-12 intact.    Psych: Appropriate mood and affect.    Consultants:  1.)  Endocrinology      Scheduled Meds:  insulin lispro, 0-10 Units, subcutaneous, TID  pantoprazole, 40 mg, oral, Daily before breakfast         Impression and Plan  In summary, Maximino Solomon is admitted to the [facility] Clinical Decision Unit for hyperglycemia. Dr. Magdaleno Byrne is the CDU admission attending.    This patient has been risk-stratified based on available history, physical exam, and related study findings. Admission to the observation status for further diagnosis/treatment/monitoring of hyperglycemia is warranted clinically. This extended period of observation is specifically required to determine the need for hospitalization.     The goals of this admission based on the patient’s clinical problem list are:   1.)  Hyperglycemia  -Monitor VS  -Serial Blood glucose  - A1c  -Encourage PO fluids  - SS Lispro #2  -Endocrinology Consulted         We will observe the patient for the following endpoints:   1.) Stable vitals  2.)  Symptomatic improvement.  3.) Cleared by Endocrinology    When met, appropriate disposition will be arranged.    Loyda Wills, APRN-CNP  [facility]3]  Emergency department  Extension [phone]

## 2024-10-14 ENCOUNTER — PHARMACY VISIT (OUTPATIENT)
Dept: PHARMACY | Facility: CLINIC | Age: 34
End: 2024-10-14
Payer: COMMERCIAL

## 2024-10-14 VITALS
WEIGHT: 247 LBS | BODY MASS INDEX: 39.7 KG/M2 | SYSTOLIC BLOOD PRESSURE: 124 MMHG | TEMPERATURE: 97.3 F | HEART RATE: 80 BPM | HEIGHT: 66 IN | DIASTOLIC BLOOD PRESSURE: 87 MMHG | OXYGEN SATURATION: 99 % | RESPIRATION RATE: 16 BRPM

## 2024-10-14 LAB
ANION GAP BLDV CALCULATED.4IONS-SCNC: 13 MMOL/L (ref 10–25)
BASE EXCESS BLDV CALC-SCNC: -0.2 MMOL/L (ref -2–3)
BODY TEMPERATURE: 37 DEGREES CELSIUS
CA-I BLDV-SCNC: 1.27 MMOL/L (ref 1.1–1.33)
CHLORIDE BLDV-SCNC: 101 MMOL/L (ref 98–107)
GLUCOSE BLD MANUAL STRIP-MCNC: 249 MG/DL (ref 74–99)
GLUCOSE BLD MANUAL STRIP-MCNC: 252 MG/DL (ref 74–99)
GLUCOSE BLD MANUAL STRIP-MCNC: 278 MG/DL (ref 74–99)
GLUCOSE BLD MANUAL STRIP-MCNC: 285 MG/DL (ref 74–99)
GLUCOSE BLDV-MCNC: 327 MG/DL (ref 74–99)
HCO3 BLDV-SCNC: 25.4 MMOL/L (ref 22–26)
HCT VFR BLD EST: 44 % (ref 41–52)
HGB BLDV-MCNC: 14.7 G/DL (ref 13.5–17.5)
INHALED O2 CONCENTRATION: 21 %
LACTATE BLDV-SCNC: 1.4 MMOL/L (ref 0.4–2)
OXYHGB MFR BLDV: 61.4 % (ref 45–75)
PCO2 BLDV: 44 MM HG (ref 41–51)
PH BLDV: 7.37 PH (ref 7.33–7.43)
PO2 BLDV: 37 MM HG (ref 35–45)
POTASSIUM BLDV-SCNC: 4.1 MMOL/L (ref 3.5–5.3)
SAO2 % BLDV: 63 % (ref 45–75)
SODIUM BLDV-SCNC: 135 MMOL/L (ref 136–145)

## 2024-10-14 PROCEDURE — 2500000002 HC RX 250 W HCPCS SELF ADMINISTERED DRUGS (ALT 637 FOR MEDICARE OP, ALT 636 FOR OP/ED): Performed by: PHYSICIAN ASSISTANT

## 2024-10-14 PROCEDURE — 99233 SBSQ HOSP IP/OBS HIGH 50: CPT | Performed by: PHYSICIAN ASSISTANT

## 2024-10-14 PROCEDURE — 2500000001 HC RX 250 WO HCPCS SELF ADMINISTERED DRUGS (ALT 637 FOR MEDICARE OP)

## 2024-10-14 PROCEDURE — G0378 HOSPITAL OBSERVATION PER HR: HCPCS

## 2024-10-14 PROCEDURE — 82947 ASSAY GLUCOSE BLOOD QUANT: CPT

## 2024-10-14 PROCEDURE — RXMED WILLOW AMBULATORY MEDICATION CHARGE

## 2024-10-14 PROCEDURE — 2500000002 HC RX 250 W HCPCS SELF ADMINISTERED DRUGS (ALT 637 FOR MEDICARE OP, ALT 636 FOR OP/ED)

## 2024-10-14 RX ORDER — BLOOD-GLUCOSE CONTROL, NORMAL
1 EACH MISCELLANEOUS 4 TIMES DAILY
Qty: 400 EACH | Refills: 0 | Status: SHIPPED | OUTPATIENT
Start: 2024-10-14

## 2024-10-14 RX ORDER — LANCETS
1 EACH MISCELLANEOUS 4 TIMES DAILY
Qty: 400 EACH | Refills: 0 | Status: SHIPPED | OUTPATIENT
Start: 2024-10-14 | End: 2024-10-14

## 2024-10-14 RX ORDER — INSULIN LISPRO 100 [IU]/ML
0-10 INJECTION, SOLUTION INTRAVENOUS; SUBCUTANEOUS
Status: DISCONTINUED | OUTPATIENT
Start: 2024-10-14 | End: 2024-10-14 | Stop reason: HOSPADM

## 2024-10-14 RX ORDER — INSULIN LISPRO 100 [IU]/ML
10 INJECTION, SOLUTION INTRAVENOUS; SUBCUTANEOUS
Status: DISCONTINUED | OUTPATIENT
Start: 2024-10-14 | End: 2024-10-14 | Stop reason: HOSPADM

## 2024-10-14 RX ORDER — IBUPROFEN 200 MG
1 CAPSULE ORAL 4 TIMES DAILY
Qty: 400 STRIP | Refills: 0 | Status: SHIPPED | OUTPATIENT
Start: 2024-10-14

## 2024-10-14 RX ORDER — DEXTROSE 4 G
TABLET,CHEWABLE ORAL
Qty: 1 EACH | Refills: 0 | OUTPATIENT
Start: 2024-10-14

## 2024-10-14 RX ORDER — INSULIN GLARGINE 100 [IU]/ML
20 INJECTION, SOLUTION SUBCUTANEOUS NIGHTLY
Qty: 15 ML | Refills: 1 | Status: SHIPPED | OUTPATIENT
Start: 2024-10-14 | End: 2025-03-13

## 2024-10-14 RX ADMIN — INSULIN LISPRO 10 UNITS: 100 INJECTION, SOLUTION INTRAVENOUS; SUBCUTANEOUS at 15:36

## 2024-10-14 RX ADMIN — INSULIN LISPRO 6 UNITS: 100 INJECTION, SOLUTION INTRAVENOUS; SUBCUTANEOUS at 15:36

## 2024-10-14 RX ADMIN — INSULIN LISPRO 10 UNITS: 100 INJECTION, SOLUTION INTRAVENOUS; SUBCUTANEOUS at 12:42

## 2024-10-14 RX ADMIN — INSULIN LISPRO 6 UNITS: 100 INJECTION, SOLUTION INTRAVENOUS; SUBCUTANEOUS at 07:44

## 2024-10-14 RX ADMIN — INSULIN LISPRO 4 UNITS: 100 INJECTION, SOLUTION INTRAVENOUS; SUBCUTANEOUS at 07:47

## 2024-10-14 RX ADMIN — INSULIN GLARGINE 20 UNITS: 100 INJECTION, SOLUTION SUBCUTANEOUS at 15:37

## 2024-10-14 RX ADMIN — PANTOPRAZOLE SODIUM 40 MG: 40 TABLET, DELAYED RELEASE ORAL at 07:51

## 2024-10-14 ASSESSMENT — ENCOUNTER SYMPTOMS
RHINORRHEA: 0
EYE DISCHARGE: 0
CONFUSION: 0
FACIAL ASYMMETRY: 0
SHORTNESS OF BREATH: 0
EYE REDNESS: 0
FACIAL SWELLING: 0
COUGH: 0

## 2024-10-14 NOTE — DISCHARGE INSTRUCTIONS
INSULIN INSTRUCTIONS   Breakfast time Lunch time Dinner time  Bedtime      Lantus/Glargine = 20 units   Humalog/Lispro = 10 units plus scale Humalog/Lispro = 10 units plus scale  Humalog/Lispro = 10 units plus scale      CORRECTIVE SCALE  GLUCOSE READING   HUMALOG/LISPRO SCALE DOSE    70 - 149 0   150 - 200 2   201 - 250 4   251 - 300 6   301 - 350 8   351 - 400+ 10     If glucose remains over 400, call your diabetes provider, repeat 10 units every 4 hours and drink sugar free   liquids (water, Gatorade zero, chicken broth) until you glucose improves or you hear back from medical   professional. If your glucose remains over 400 and you develop symptoms such as nausea/vomiting or   confusion, go to the emergency room as soon as possible.

## 2024-10-14 NOTE — PROGRESS NOTES
Subjective   Maximino Solomon is a 33 y.o. male on day 2 of admission presenting with DM 1 with Hyperglycemia due to medical noncompliance. He admits that he has not had insulin in several weeks as he does not have insurance and cannot afford to purchase his medications. SW has evaluated his situation and is arranging a course of medications to bridge until he has insurance coverage.  Endocrinology has evaluated his presentation and made recommendations for continued insulin therapy.  Pt has no complaints at exam.       Objective     Physical Exam  Vitals and nursing note reviewed.   Constitutional:       General: He is not in acute distress.     Appearance: Normal appearance. He is well-developed. He is not ill-appearing, toxic-appearing or diaphoretic.   HENT:      Head: Normocephalic and atraumatic.      Right Ear: Tympanic membrane normal.      Left Ear: Tympanic membrane normal.      Nose: Nose normal. No congestion or rhinorrhea.      Mouth/Throat:      Mouth: Mucous membranes are moist.      Pharynx: Oropharynx is clear.   Eyes:      Extraocular Movements: Extraocular movements intact.      Pupils: Pupils are equal, round, and reactive to light.   Cardiovascular:      Rate and Rhythm: Normal rate and regular rhythm.      Heart sounds: No murmur heard.  Pulmonary:      Effort: Pulmonary effort is normal. No respiratory distress.      Breath sounds: Normal breath sounds.   Abdominal:      General: Bowel sounds are normal. There is no distension.      Palpations: Abdomen is soft.      Tenderness: There is no abdominal tenderness. There is no guarding or rebound.   Musculoskeletal:         General: No tenderness. Normal range of motion.      Cervical back: Normal range of motion and neck supple. No rigidity.      Right lower leg: No edema.      Left lower leg: No edema.   Skin:     General: Skin is warm and dry.      Capillary Refill: Capillary refill takes less than 2 seconds.   Neurological:      General:  "No focal deficit present.      Mental Status: He is alert and oriented to person, place, and time. Mental status is at baseline.      GCS: GCS eye subscore is 4. GCS verbal subscore is 5. GCS motor subscore is 6.      Cranial Nerves: Cranial nerves 2-12 are intact. No cranial nerve deficit.      Sensory: Sensation is intact.      Motor: Motor function is intact.      Coordination: Coordination is intact.      Gait: Gait is intact.      Comments: Speech clear, thoughts concise.   Psychiatric:         Attention and Perception: Attention normal.         Mood and Affect: Mood normal.         Speech: Speech normal.         Behavior: Behavior is cooperative.         Cognition and Memory: Cognition normal.         Judgment: Judgment normal.         Last Recorded Vitals  Blood pressure (!) 169/98, pulse 69, temperature 36.3 °C (97.3 °F), temperature source Temporal, resp. rate 16, height 1.676 m (5' 6\"), weight 112 kg (247 lb), SpO2 98%.  Intake/Output last 3 Shifts:  No intake/output data recorded.    Relevant Results  Results for orders placed or performed during the hospital encounter of 10/12/24 (from the past 24 hour(s))   POCT GLUCOSE   Result Value Ref Range    POCT Glucose 247 (H) 74 - 99 mg/dL   POCT GLUCOSE   Result Value Ref Range    POCT Glucose 268 (H) 74 - 99 mg/dL   POCT GLUCOSE   Result Value Ref Range    POCT Glucose 274 (H) 74 - 99 mg/dL   POCT GLUCOSE   Result Value Ref Range    POCT Glucose 267 (H) 74 - 99 mg/dL   POCT GLUCOSE   Result Value Ref Range    POCT Glucose 281 (H) 74 - 99 mg/dL             FINDINGS:                  CARDIOMEDIASTINAL SILHOUETTE:  Cardiomediastinal silhouette is normal in size and configuration.      LUNGS:  Lungs are clear.      ABDOMEN:  No remarkable upper abdominal findings.      BONES:  No acute osseous changes.      IMPRESSION:  1.  No evidence of acute cardiopulmonary process.              MACRO:  None      Signed by: Julian Wright 10/12/2024 3:32 PM         "       Assessment/Plan   Assessment & Plan  Hyperglycemia    Type 2 diabetes mellitus with hyperglycemia, with long-term current use of insulin  -- Diet control, Lantus 20 units daily, Lispro 6 units with meals plus sliding scale  2.  GERD:  PPI  3.  Medically noncompliant-- SW to help obtain medications.         I spent 30 minutes in the professional and overall care of this patient.      Augie Dueñas PA-C

## 2024-10-14 NOTE — ASSESSMENT & PLAN NOTE
-- Diet control, Lantus 20 units daily, Lispro 6 units with meals plus sliding scale  2.  GERD:  PPI  3.  Medically noncompliant-- SW to help obtain medications.

## 2024-10-14 NOTE — PROGRESS NOTES
Maximino Solomon is a 33 y.o. male on day 0 of admission presenting with Hyperglycemia.    Subjective   The patient arrived in the ED on 10/12/24 for hyperglycemia. The patient states that he had just recently moved to [place] from [place] and is expected to start work on 10/21/24. In the meantime the patient has been without insurance and unable to afford medication or glucose monitors for his diabetes. He expects that he will have insurance coverage 90 days after starting work with [employer].       I have reviewed histories, allergies and medications have been reviewed and there are no changes       Objective   Review of Systems   HENT:  Negative for facial swelling and rhinorrhea.    Eyes:  Negative for discharge and redness.   Respiratory:  Negative for cough and shortness of breath.    Skin:  Negative for pallor and rash.   Neurological:  Negative for facial asymmetry.   Psychiatric/Behavioral:  Negative for behavioral problems and confusion.      Physical Exam  Constitutional:       Appearance: Normal appearance. He is obese.   HENT:      Right Ear: External ear normal.      Left Ear: External ear normal.   Eyes:      General:         Right eye: No discharge.         Left eye: No discharge.   Cardiovascular:      Rate and Rhythm: Normal rate and regular rhythm.      Heart sounds: Normal heart sounds. No murmur heard.     No friction rub.   Pulmonary:      Effort: Pulmonary effort is normal.      Breath sounds: Normal breath sounds. No stridor. No wheezing.   Musculoskeletal:         General: No swelling or deformity.   Neurological:      General: No focal deficit present.      Mental Status: He is alert and oriented to person, place, and time.   Psychiatric:         Mood and Affect: Mood normal.         Behavior: Behavior normal.         Thought Content: Thought content normal.         Last Recorded Vitals  Blood pressure 138/85, pulse 90, temperature 36.3 °C (97.3 °F), temperature source Temporal,  "resp. rate 16, height 1.676 m (5' 6\"), weight 112 kg (247 lb), SpO2 100%.  Intake/Output last 3 Shifts:  No intake/output data recorded.    Relevant Results  Results from last 7 days   Lab Units 10/14/24  1124 10/14/24  0707 10/13/24  2230 10/13/24  1634 10/13/24  1038 10/12/24  1838 10/12/24  1437   POCT GLUCOSE mg/dL 278* 249* 281* 267* 274*   < >  --    GLUCOSE mg/dL  --   --   --   --   --   --  294*    < > = values in this interval not displayed.       Assessment/Plan   Assessment & Plan  Hyperglycemia    Type 2 diabetes mellitus with hyperglycemia, with long-term current use of insulin    PLAN  Nutrition: Adult diet CCD 60mg/meal    - continue glargine 20u daily       If NPO: reduce to   - increase lispro 10u with meals plus scale       If NPO: hold  - continue lispro corrective scale #2 with meals, adjust to q4h if NPO or if persistently hyperglycemic   = 0u  151-200 = 2u  201-250 = 4u  251-300 = 6u  301-350 = 8u  351-400 = 10u    -Accuchecks (not BMP) TIDAC and QHS- kindly ensure QHS Accucheck is drawn; it is often missed   - Goal -180  -Hypoglycemia protocol  -Will continue to follow and titrate insulin accordingly      Discharge planning:   [x] patient may expect to discharge home on 10/14/24, final doses TBD by titration  [x]will provide CGM sample prior to discharge. Patient was provided with 2 Dexcom G7 sensors and reader. One sensor was attached to patient prior to the end of the visit and hooked up with reader.   [x]will enroll pt in  pharmacy platinum plan program  [x]follow up with healthy at home               Dar Perales PA-C   Endocrinology  Inpatient Diabetes Management Team      "

## 2024-10-14 NOTE — PROGRESS NOTES
Maximino Solomon is a 33 y.o. male on day 0 of admission presenting with Hyperglycemia. Patient presented to the ED without insurance or financial coverage. VIVIEN sent patient's information to [facility] team for bedside Medicaid-eligibility screening initially - [facility] met with patient, who has been able to received [place] ([place]) benefits until his [employer] benefits take effect in the next ~90 days. HRS confirmed via email that patient has agreed to notify [place] Medicaid when those healthcare benefits take effect and report change in income.  Patient's Pending Medicaid # has been sent via fax to [facility]. VIVIEN followed with medical team; waiting for meds 2 beds. Patient is medically ready.  SW to offer patient bus pass.

## 2024-10-14 NOTE — DISCHARGE SUMMARY
Disposition Note  [facility] CLINICAL DECISION  Patient: Maximino Solomon  MRN: [id number]  : 1990  Date of Evaluation: 10/12/2024  ED Provider: José Luis Lopes PA-C      Limitations to history: None  Independent Historian: Yes  External Records Reviewed: Recent and relevant inpatient and outpatient notes in EMR      Subjective:    Maximino Solomon is a 33 y.o. male has undergone comprehensive diagnostic evaluation and therapeutic management in accordance with the CDU guidelines for hyperglycemia. Based on Mr. Solomon's clinical response and diagnostic information during this period of observation, it has been determined that the patient will be discharged.}     The acute evaluation included:  Orders Placed This Encounter   Procedures    Home blood glucose meter    Home blood glucose meter    XR chest 1 view    CBC with Differential    Comprehensive Metabolic Panel    Troponin I Series, High Sensitivity (0, 1 HR)    Lipase    Protime-INR    Troponin I, High Sensitivity, Initial    Blood Gas Venous Full Panel    Urinalysis with Reflex Culture and Microscopic    Urinalysis with Reflex Culture and Microscopic    Extra Urine Gray Tube    Beta Hydroxybutyrate    Troponin Series, (0, 1 HR)    Troponin I, High Sensitivity, Initial    Troponin, High Sensitivity, 1 Hour    Blood Gas Venous Full Panel    Hemoglobin A1C    Referral to Clinical Pharmacy    Referral to Healthy at Home Program    Adult diet Regular, Consistent Carb; CCD 60 gm/meal    Communication - NIPP Chest Pain    Communication - Print out prior EKG for comparison    NIPP Communication Altered Mental Status    Activity (specify) No Restrictions    No telemetry or cardiac monitoring required    Do NOT give corective scale insulin at bedtime without calling Provider.    Add Corrective scale insulin dose to scheduled pre-meal insulin, if ordered.    Notify provider (specify parameters)    Notify provider (specify parameters)    Notify  provider (specify parameters)    Diet instructions to nursing: 15 grams oral carbohydrate for low blood glucose    Glucose 10-70 mg/dL & CONSCIOUS- Give 15 Grams of Carbohydrates and repeat until blood glucose level reaches 100 mg/dL or greater.    Notify provider (specify parameters)    Notify provider (specify parameters)    Notify provider (specify parameters)    Full code    Inpatient consult to Endocrinology    POCT GLUCOSE    POCT GLUCOSE    POCT GLUCOSE    POCT GLUCOSE    POCT GLUCOSE    POCT GLUCOSE    POCT GLUCOSE    POCT GLUCOSE    POCT GLUCOSE    POCT GLUCOSE    POCT GLUCOSE    ECG 12 lead    ECG 12 Lead    Electrocardiogram, 12-lead PRN ACS symptoms    Insert and maintain peripheral IV    Insert and maintain peripheral IV    Saline lock IV    Send to CDU    Initiate observation status         Placed in observation at: 0007       Past History   No past medical history on file.  No past surgical history on file.  Social History     Socioeconomic History    Marital status: Single   Tobacco Use    Smoking status: Unknown     Social Determinants of Health     Financial Resource Strain: Medium Risk (4/17/2022)    Received from [facility]    Overall Financial Resource Strain (CARDIA)     Difficulty of Paying Living Expenses: Somewhat hard   Food Insecurity: Unknown (7/15/2023)    Received from [facility]    Hunger Vital Sign     Worried About Running Out of Food in the Last Year: Never true   Transportation Needs: No Transportation Needs (4/17/2022)    Received from [facility]    PRAPARE - Transportation     Lack of Transportation (Medical): No     Lack of Transportation (Non-Medical): No   Physical Activity: Inactive (4/17/2022)    Received from [facility]    Exercise Vital Sign     Days of Exercise per Week: 0 days     Minutes of Exercise per Session: 0 min   Stress: Stress Concern Present (4/17/2022)    Received  from [facility]    [employer] - Occupational Stress Questionnaire     Feeling of Stress : To some extent   Social Connections: Socially Isolated (4/17/2022)    Received from [facility]    Social Connection and Isolation Panel [NHANES]     Frequency of Communication with Friends and Family: Three times a week     Frequency of Social Gatherings with Friends and Family: Never     Attends Voodoo Services: Never     Active Member of Clubs or Organizations: No     Attends Club or Organization Meetings: Never     Marital Status: Never    Housing Stability: Unknown (4/17/2022)    Received from [facility]    Housing Stability Vital Sign     Number of Places Lived in the Last Year: 0     Unstable Housing in the Last Year: Patient refused         Medications/Allergies     Previous Medications    OMEPRAZOLE (PRILOSEC) 20 MG DR CAPSULE    Take 1 capsule (20 mg) by mouth once daily.     No Known Allergies      Review of Systems  All systems reviewed and otherwise negative, except as stated above in HPI.    Diagnostics reviewed by José Luis Lopes PA-C     Labs:  Results for orders placed or performed during the hospital encounter of 10/12/24   CBC with Differential   Result Value Ref Range    WBC 5.9 4.4 - 11.3 x10*3/uL    nRBC 0.0 0.0 - 0.0 /100 WBCs    RBC 5.20 4.50 - 5.90 x10*6/uL    Hemoglobin 14.3 13.5 - 17.5 g/dL    Hematocrit 43.2 41.0 - 52.0 %    MCV 83 80 - 100 fL    MCH 27.5 26.0 - 34.0 pg    MCHC 33.1 32.0 - 36.0 g/dL    RDW 12.6 11.5 - 14.5 %    Platelets 365 150 - 450 x10*3/uL    Neutrophils % 48.5 40.0 - 80.0 %    Immature Granulocytes %, Automated 0.2 0.0 - 0.9 %    Lymphocytes % 39.7 13.0 - 44.0 %    Monocytes % 6.6 2.0 - 10.0 %    Eosinophils % 4.2 0.0 - 6.0 %    Basophils % 0.8 0.0 - 2.0 %    Neutrophils Absolute 2.88 1.20 - 7.70 x10*3/uL    Immature Granulocytes Absolute, Automated 0.01 0.00 - 0.70  x10*3/uL    Lymphocytes Absolute 2.36 1.20 - 4.80 x10*3/uL    Monocytes Absolute 0.39 0.10 - 1.00 x10*3/uL    Eosinophils Absolute 0.25 0.00 - 0.70 x10*3/uL    Basophils Absolute 0.05 0.00 - 0.10 x10*3/uL   Comprehensive Metabolic Panel   Result Value Ref Range    Glucose 294 (H) 74 - 99 mg/dL    Sodium 135 (L) 136 - 145 mmol/L    Potassium 3.8 3.5 - 5.3 mmol/L    Chloride 101 98 - 107 mmol/L    Bicarbonate 25 21 - 32 mmol/L    Anion Gap 13 10 - 20 mmol/L    Urea Nitrogen 8 6 - 23 mg/dL    Creatinine 0.62 0.50 - 1.30 mg/dL    eGFR >90 >60 mL/min/1.73m*2    Calcium 9.8 8.6 - 10.6 mg/dL    Albumin 4.6 3.4 - 5.0 g/dL    Alkaline Phosphatase 77 33 - 120 U/L    Total Protein 7.9 6.4 - 8.2 g/dL    AST 13 9 - 39 U/L    Bilirubin, Total 0.5 0.0 - 1.2 mg/dL    ALT 18 10 - 52 U/L   Lipase   Result Value Ref Range    Lipase 12 9 - 82 U/L   Protime-INR   Result Value Ref Range    Protime 8.5 (L) 9.8 - 12.8 seconds    INR 0.8 (L) 0.9 - 1.1   Troponin I, High Sensitivity, Initial   Result Value Ref Range    Troponin I, High Sensitivity (CMC) 7 0 - 53 ng/L   Blood Gas Venous Full Panel   Result Value Ref Range    POCT pH, Venous 7.37 7.33 - 7.43 pH    POCT pCO2, Venous 44 41 - 51 mm Hg    POCT pO2, Venous 37 35 - 45 mm Hg    POCT SO2, Venous 63 45 - 75 %    POCT Oxy Hemoglobin, Venous 61.4 45.0 - 75.0 %    POCT Hematocrit Calculated, Venous 44.0 41.0 - 52.0 %    POCT Sodium, Venous 135 (L) 136 - 145 mmol/L    POCT Potassium, Venous 4.1 3.5 - 5.3 mmol/L    POCT Chloride, Venous 101 98 - 107 mmol/L    POCT Ionized Calicum, Venous 1.27 1.10 - 1.33 mmol/L    POCT Glucose, Venous 327 (H) 74 - 99 mg/dL    POCT Lactate, Venous 1.4 0.4 - 2.0 mmol/L    POCT Base Excess, Venous -0.2 -2.0 - 3.0 mmol/L    POCT HCO3 Calculated, Venous 25.4 22.0 - 26.0 mmol/L    POCT Hemoglobin, Venous 14.7 13.5 - 17.5 g/dL    POCT Anion Gap, Venous 13.0 10.0 - 25.0 mmol/L    Patient Temperature 37.0 degrees Celsius    FiO2 21 %   Urinalysis with Reflex Culture  and Microscopic   Result Value Ref Range    Color, Urine Light-Yellow Light-Yellow, Yellow, Dark-Yellow    Appearance, Urine Clear Clear    Specific Gravity, Urine >1.050 (N) 1.005 - 1.035    pH, Urine 5.5 5.0, 5.5, 6.0, 6.5, 7.0, 7.5, 8.0    Protein, Urine NEGATIVE NEGATIVE, 10 (TRACE), 20 (TRACE) mg/dL    Glucose, Urine OVER (4+) (A) Normal mg/dL    Blood, Urine NEGATIVE NEGATIVE    Ketones, Urine TRACE (A) NEGATIVE mg/dL    Bilirubin, Urine NEGATIVE NEGATIVE    Urobilinogen, Urine Normal Normal mg/dL    Nitrite, Urine NEGATIVE NEGATIVE    Leukocyte Esterase, Urine NEGATIVE NEGATIVE   Extra Urine Gray Tube   Result Value Ref Range    Extra Tube Hold for add-ons.    Beta Hydroxybutyrate   Result Value Ref Range    Beta-Hydroxybutyrate 0.31 (H) 0.02 - 0.27 mmol/L   Troponin I, High Sensitivity, Initial   Result Value Ref Range    Troponin I, High Sensitivity (CMC) 6 0 - 53 ng/L   Troponin, High Sensitivity, 1 Hour   Result Value Ref Range    Troponin I, High Sensitivity (CMC) 7 0 - 53 ng/L   Hemoglobin A1C   Result Value Ref Range    Hemoglobin A1C 12.8 (H) See comment %    Estimated Average Glucose 321 Not Established mg/dL   POCT GLUCOSE   Result Value Ref Range    POCT Glucose 497 (H) 74 - 99 mg/dL   POCT GLUCOSE   Result Value Ref Range    POCT Glucose 280 (H) 74 - 99 mg/dL   POCT GLUCOSE   Result Value Ref Range    POCT Glucose 183 (H) 74 - 99 mg/dL   POCT GLUCOSE   Result Value Ref Range    POCT Glucose 247 (H) 74 - 99 mg/dL   POCT GLUCOSE   Result Value Ref Range    POCT Glucose 268 (H) 74 - 99 mg/dL   POCT GLUCOSE   Result Value Ref Range    POCT Glucose 274 (H) 74 - 99 mg/dL   POCT GLUCOSE   Result Value Ref Range    POCT Glucose 267 (H) 74 - 99 mg/dL   POCT GLUCOSE   Result Value Ref Range    POCT Glucose 281 (H) 74 - 99 mg/dL   POCT GLUCOSE   Result Value Ref Range    POCT Glucose 249 (H) 74 - 99 mg/dL   POCT GLUCOSE   Result Value Ref Range    POCT Glucose 278 (H) 74 - 99 mg/dL   POCT GLUCOSE   Result  "Value Ref Range    POCT Glucose 285 (H) 74 - 99 mg/dL     Radiographs:  XR chest 1 view   Final Result   1.  No evidence of acute cardiopulmonary process.                  MACRO:   None        Signed by: Julian Wright 10/12/2024 3:32 PM   Dictation workstation:   [id number]              Physical Exam     Visit Vitals  /87 (BP Location: Right arm, Patient Position: Lying)   Pulse 80   Temp 36.3 °C (97.3 °F) (Temporal)   Resp 16   Ht 1.676 m (5' 6\")   Wt 112 kg (247 lb)   SpO2 99%   BMI 39.87 kg/m²   Smoking Status Unknown   BSA 2.28 m²       Physical exam  VS: As documented in the triage note and EMR flowsheet from this visit were reviewed.    General: Patient is AAOx3, appears well developed, well nourished, is a good historian, answers questions appropriately    HEENT: head normocephalic, atraumatic, PERRLA, EOMs intact, oropharynx without erythema or exudate, buccal mucosa intact without lesions, nose is patent bilateral    Neck: supple, full ROM, negative for lymphadenopathy, JVD, thyromegaly, tracheal deviation, nuchal rigidity    Pulmonary: Clear to auscultation bilaterally, No wheezing, rales, or rhonchi, no accessory muscle use, able to speak full clear sentences    Cardiac: Normal rate and rhythm, no murmurs, rubs or gallops    GI: soft, non-tender, non-distended, normoactive bowelsounds in all four quadrants, no masses or organomegaly, no guarding or CVA tenderness noted    Musculoskeletal: full weight bearing, VASQUEZ, no joint effusions, clubbing or edema noted    Skin: Warm, dry, intact, no lesions or rashes noted, turgor is good.    Neuro: patient follow commands, cranial nerves 2-12 grossly intact, motor strengths 5/5 upper and lower extremities, sensation are symmetrical. No focal deficits.    Psych: Appropriate mood and affect for situation      Consultants  1) Endocrinology: Please see EMR for details      Impression and Plan    In summary, Maximino Solomon has been cared for according to the " standard [facility] Clinical Decision Unit observation protocol for Hyperglycemia. This extended period of observation was specifically required to determine the need for hospitalization. Prior to discharge from observation, the final physical exam is documented above.     Significant events during the course of observation based on the goals of the clinical problem list include:   1) Remained stable  2) Blood glucose stabilized    Based on the patient's condition and test results, the patient will be discharged    Total length of observation was 10 hours. Dr. Bill is the CDU disposition attending.      Discharge Diagnosis  Hyperglycemia    Issues Requiring Follow-Up    See below     Discharge Meds     Your medication list        START taking these medications        Instructions Last Dose Given Next Dose Due   alcohol swabs pads, medicated  Commonly known as: Alcohol Pads      Use 4-8 per day to check blood glucose and for injectable medications       Basaglar KwikPen U-100 Insulin 100 unit/mL (3 mL) pen  Generic drug: insulin glargine      Inject 20 Units under the skin once daily at bedtime. Take as directed per insulin instructions.       Blood Glucose Test strip  Generic drug: blood sugar diagnostic      1 each 4 times a day. Use to check glucose 4 times daily, before meals and at bedtime       blood-glucose meter misc      Use 4 times a day, breakfast, lunch, dinner and before bed       insulin lispro 100 unit/mL injection  Commonly known as: HumaLOG KwikPen Insulin      Inject 6 units under the skin three times daily (morning, midday, late afternoon). Can inject 0-10 Units under the skin 3 times daily for correction. Meal time Corrective Scale #2 :   2 unit(s) if Blood glucose is between 151-200   4 unit(s) if Blood glucose is between 201-250   6 unit(s) if Blood glucose is between 251-300   8 unit(s) if Blood glucose is between 301-350   10 unit(s) if Blood glucose is between  "351-400       insulin lispro 100 unit/mL injection  Commonly known as: HumaLOG KwikPen Insulin      Inject 6 Units under the skin 3 times daily (morning, midday, late afternoon). Take as directed per insulin instructions.       lancets misc      1 each 4 times a day. Use to check glucose 4 times daily, before meals and at bedtime    [id number]       pen needle, diabetic 31 gauge x 5/16\" needle      Use one needle 4 times a day to check blood sugar              ASK your doctor about these medications        Instructions Last Dose Given Next Dose Due   omeprazole 20 mg DR capsule  Commonly known as: PriLOSEC                     Where to Get Your Medications        These medications were sent to [facility]  [address], [address], [facility] [address]      Hours: 8AM to 6PM Mon-Fri, 8AM to 4PM Sat, 9AM to 1PM Sun Phone: [phone]   alcohol swabs pads, medicated  Basaglar KwikPen U-100 Insulin 100 unit/mL (3 mL) pen  Blood Glucose Test strip  blood-glucose meter misc  insulin lispro 100 unit/mL injection  insulin lispro 100 unit/mL injection  lancets misc  pen needle, diabetic 31 gauge x 5/16\" needle         Test Results Pending At Discharge  Pending Labs       Order Current Status    Blood Gas Venous Full Panel In process            Hospital Course   Mr. Solomon was admitted to the clinical decision unit for hyperglycemia.  Medical workup included EKG, laboratory studies and imaging.  Diagnostic information was obtained, reviewed and discussed with the patient.  EKG reveals a normal sinus rhythm at a rate of 80 with no ischemic changes or arrhythmias.  CBC was unremarkable.  CMP with initial serum glucose of 294 without findings of DKA.  Patient received lispro 4 units subcu and repeat blood glucose down trended to 183.  High-sensitivity serum troponins of 7 and 6.  Serum lipase within normal limits.  Beta hydroxybutyrate slightly elevated at 0.31 and urinalysis was inconsistent with UTI.  " Chest x-ray shows no acute cardiopulmonary abnormalities.  Endocrinology was contacted and consult requested.  Patient was evaluated by the endocrinology team and serial blood glucose were performed.  Endocrinology recommended Humalog 6 units with meals, Basaglar 20 units daily and sliding scale #2.  The patient recently relocating to [place], he had no access to health insurance at this time.  ED  did meet with the patient and was able to help obtain his required medication from pharmacy which were delivered meds to bed.  Mr. Solomon has remained clinically, hemodynamically neurologically intact and was informed of his clinical findings.  Patient will be discharged home with instructions to follow-up with his PCP in the next 2 to 4 weeks.  He was advised to return to the emergency department if experiences any new or worsening symptoms or has any other concerns.  Plan of care was discussed with the patient and he verbalized understanding and is in agreement was discharged in stable condition.    Outpatient Follow-Up  Future Appointments   Date Time Provider Department Center   11/12/2024  3:00 PM Priyanka Meyer PA-C [id number] [facility]         José Luis Lopes PA-C

## 2024-10-18 ENCOUNTER — PATIENT OUTREACH (OUTPATIENT)
Dept: CARE COORDINATION | Age: 34
End: 2024-10-18

## 2024-11-12 ENCOUNTER — APPOINTMENT (OUTPATIENT)
Dept: ENDOCRINOLOGY | Facility: CLINIC | Age: 34
End: 2024-11-12